# Patient Record
Sex: FEMALE | Race: WHITE | NOT HISPANIC OR LATINO | URBAN - METROPOLITAN AREA
[De-identification: names, ages, dates, MRNs, and addresses within clinical notes are randomized per-mention and may not be internally consistent; named-entity substitution may affect disease eponyms.]

---

## 2021-03-08 ENCOUNTER — OFFICE VISIT (OUTPATIENT)
Dept: URBAN - METROPOLITAN AREA CLINIC 23 | Facility: CLINIC | Age: 66
End: 2021-03-08
Payer: MEDICARE

## 2021-03-08 ENCOUNTER — DASHBOARD ENCOUNTERS (OUTPATIENT)
Age: 66
End: 2021-03-08

## 2021-03-08 DIAGNOSIS — Z12.11 COLON CANCER SCREENING: ICD-10-CM

## 2021-03-08 DIAGNOSIS — D50.9 IRON DEFICIENCY ANEMIA: ICD-10-CM

## 2021-03-08 PROBLEM — 87522002 IRON DEFICIENCY ANEMIA: Status: ACTIVE | Noted: 2021-03-08

## 2021-03-08 PROCEDURE — 99213 OFFICE O/P EST LOW 20 MIN: CPT | Performed by: INTERNAL MEDICINE

## 2021-03-08 RX ORDER — ONDANSETRON HCL 8 MG
TABLET ORAL
Qty: 0 | Refills: 0 | COMMUNITY
Start: 1900-01-01

## 2021-03-08 RX ORDER — LINACLOTIDE 290 UG/1
CAPSULE, GELATIN COATED ORAL
Qty: 0 | Refills: 0 | Status: DISCONTINUED | COMMUNITY
Start: 1900-01-01

## 2022-07-19 NOTE — HPI-OTHER HISTORIES
65F here for further eval of anemia saw me for anemia in 2019 EGD2019--large hiatal hernia. reflux esophagitis [id number]--yw had bradycardia and hypotension. procedure aborted. Labs 7/2020--Hb 9 CT abd 2019--large hiatal hernia chr constipation--takes colace prn denies black stool, brbpr she is on PO iron which colors the stool dark she is on aspirin 325 for h/o stroke wt stable. good appetite Patient scheduled for surgery with Dr Pierre Amezquita on 10/4/22. Surgery consent on arrival. Patient to do pre op urine culture ,fasting labs and chest xray on 10/20/22. Dr Larkin Card to clear. Surgery instructions mailed to the patient. Patient will have an adult over the age of 25 with them at discharge and 24 hours after procedure.        For Brent #  [id number]  per [facility]